# Patient Record
Sex: MALE | Race: WHITE | HISPANIC OR LATINO | Employment: OTHER | URBAN - METROPOLITAN AREA
[De-identification: names, ages, dates, MRNs, and addresses within clinical notes are randomized per-mention and may not be internally consistent; named-entity substitution may affect disease eponyms.]

---

## 2021-10-19 ENCOUNTER — HOSPITAL ENCOUNTER (OUTPATIENT)
Dept: RADIOLOGY | Facility: HOSPITAL | Age: 81
Discharge: HOME | End: 2021-10-19
Payer: COMMERCIAL

## 2021-10-19 DIAGNOSIS — M25.512 LEFT SHOULDER PAIN, UNSPECIFIED CHRONICITY: ICD-10-CM

## 2021-10-19 PROCEDURE — 73030 X-RAY EXAM OF SHOULDER: CPT

## 2021-10-25 ENCOUNTER — HOSPITAL ENCOUNTER (OUTPATIENT)
Dept: RADIOLOGY | Facility: HOSPITAL | Age: 81
Discharge: HOME | End: 2021-10-25
Payer: COMMERCIAL

## 2021-10-25 DIAGNOSIS — R91.8 LUNG MASS: ICD-10-CM

## 2021-10-25 DIAGNOSIS — M25.512 LEFT SHOULDER PAIN, UNSPECIFIED CHRONICITY: ICD-10-CM

## 2021-10-25 PROCEDURE — 71046 X-RAY EXAM CHEST 2 VIEWS: CPT

## 2021-11-18 ENCOUNTER — HOSPITAL ENCOUNTER (OUTPATIENT)
Dept: MRI IMAGING | Facility: HOSPITAL | Age: 81
Discharge: HOME | End: 2021-11-18
Payer: COMMERCIAL

## 2021-11-18 DIAGNOSIS — R93.6 ABNORMAL FINDINGS ON DIAGNOSTIC IMAGING OF LIMBS: ICD-10-CM

## 2021-11-18 PROCEDURE — 73221 MRI JOINT UPR EXTREM W/O DYE: CPT

## 2022-01-11 RX ORDER — PANTOPRAZOLE SODIUM 40 MG/1
TABLET, DELAYED RELEASE ORAL
COMMUNITY
Start: 2021-11-11

## 2022-01-14 ENCOUNTER — OFFICE VISIT (OUTPATIENT)
Dept: OBGYN CLINIC | Facility: CLINIC | Age: 82
End: 2022-01-14
Payer: COMMERCIAL

## 2022-01-14 VITALS
HEART RATE: 89 BPM | DIASTOLIC BLOOD PRESSURE: 54 MMHG | WEIGHT: 108 LBS | BODY MASS INDEX: 16 KG/M2 | SYSTOLIC BLOOD PRESSURE: 104 MMHG | HEIGHT: 69 IN

## 2022-01-14 DIAGNOSIS — S46.012A TRAUMATIC COMPLETE TEAR OF LEFT ROTATOR CUFF, INITIAL ENCOUNTER: Primary | ICD-10-CM

## 2022-01-14 PROCEDURE — 99243 OFF/OP CNSLTJ NEW/EST LOW 30: CPT | Performed by: ORTHOPAEDIC SURGERY

## 2022-01-14 RX ORDER — HYDROXYZINE HYDROCHLORIDE 10 MG/1
TABLET, FILM COATED ORAL
COMMUNITY
Start: 2021-12-19

## 2022-01-14 NOTE — LETTER
January 14, 2022     Shadia Forde, [address]    Patient: Jorje Wolfe   YOB: 1940   Date of Visit: 1/14/2022       Dear Dr Gabby Del Angel: Thank you for referring Jorje Wolfe to me for evaluation  Below are my notes for this consultation  If you have questions, please do not hesitate to call me  I look forward to following your patient along with you  Sincerely,        Leann Amador DO        CC: No Recipients  Leann Amador DO  1/14/2022 11:01 AM  Signed  Assessment:  1  Traumatic complete tear of left rotator cuff, initial encounter       There is no problem list on file for this patient  Plan      Pleasant 20-year-old male with complete rotator cuff tear of left shoulder  MRI of the left shoulder were obtained, reviewed and discussed with the patient and his son who is translating, which demonstrates complete rotator cuff tear with retraction and muscle atrophy  Treatment options were discussed which would consist of a supercapsular reconstruction or a reverse total shoulder replacement  Treatment options were discussed with the patient and his son  Which would consist of your rotator cuff repair which would be done arthroscopically or a reverse total shoulder replacement which would be an open procedure  Advised to begin formal physical therapy for range of motion strengthening to be optimized for surgical intervention  Referral was placed to see Dr Nory Cabello who does perform these procedures  Subjective:     Patient ID:    Chief Complaint:Herman Nathaly Glass Mendelraymond Chbil 80 y o  male      HPI    Patient comes in today with regards to left shoulder pain  Patient is present with his son who is translating  The patient reports that the pain is in the posterior aspect of the left shopulder and has been going on for April of last year  He was helping a cousin and performing repetive overhead activities    The pain is rated at0 at its best and10 at its worst  He notes having when picking up something from below the waist line his pain will be a 5 and then once he goes overhead it will shoot to a 9 or 10  Denies prior treatment of the left shoulder  Denies numbness and tingling  The pain is described as burning when he is performing overhead activities  It is worsened with overhead activites, and is made better with rest   The patient has taken no medications or topical gels for treatment  Patient was seen by his PCP who ordered an MRI of the left shoulder  The following portions of the patient's history were reviewed and updated as appropriate: allergies, current medications, past family history, past social history, past surgical history and problem list         Social History     Socioeconomic History    Marital status: /Civil Union     Spouse name: Not on file    Number of children: Not on file    Years of education: Not on file    Highest education level: Not on file   Occupational History    Not on file   Tobacco Use    Smoking status: Never Smoker    Smokeless tobacco: Never Used   Substance and Sexual Activity    Alcohol use: Not on file    Drug use: Not on file    Sexual activity: Not on file   Other Topics Concern    Not on file   Social History Narrative    Not on file     Social Determinants of Health     Financial Resource Strain: Not on file   Food Insecurity: Not on file   Transportation Needs: Not on file   Physical Activity: Not on file   Stress: Not on file   Social Connections: Not on file   Intimate Partner Violence: Not on file   Housing Stability: Not on file     No past medical history on file  No past surgical history on file  No Known Allergies  Current Outpatient Medications on File Prior to Visit   Medication Sig Dispense Refill    hydrOXYzine HCL (ATARAX) 10 mg tablet       pantoprazole (PROTONIX) 40 mg tablet        No current facility-administered medications on file prior to visit  Objective:    Review of Systems   Constitutional: Negative for appetite change and unexpected weight change  HENT: Negative for congestion and trouble swallowing  Eyes: Negative for visual disturbance  Respiratory: Negative for cough and shortness of breath  Cardiovascular: Negative for chest pain and palpitations  Gastrointestinal: Negative for nausea and vomiting  Endocrine: Negative for cold intolerance and heat intolerance  Musculoskeletal: Positive for arthralgias (Left shoulder)  Negative for gait problem and myalgias  Skin: Negative for rash  Neurological: Negative for numbness  Right Shoulder Exam     Comments:  150 forward flexion  120 abduction  T10 internal rotation  T1 external rotation  4/5 strength external rotation  3/5 strength abduction      Left Shoulder Exam     Tenderness   The patient is experiencing tenderness in the biceps tendon  Other   Erythema: absent  Scars: absent  Sensation: normal  Pulse: present     Comments:  Skin intact   No erythema or ecchymosis   No redness or warmth   No signs of infection   No gross deformities   90 forward flexion  90 abduction with pain   T10 internal rotation  C7 external rotation  3/5 strength external   2/5 strength flexion  4+/5 strength abduction            Physical Exam  Vitals reviewed  Constitutional:       Appearance: Normal appearance  He is well-developed  HENT:      Head: Normocephalic and atraumatic  Nose: Nose normal    Eyes:      General:         Right eye: No discharge  Left eye: No discharge  Conjunctiva/sclera: Conjunctivae normal    Cardiovascular:      Rate and Rhythm: Normal rate and regular rhythm  Pulses: Normal pulses  Pulmonary:      Effort: Pulmonary effort is normal  No respiratory distress  Musculoskeletal:         General: Normal range of motion  Cervical back: Normal range of motion and neck supple  Skin:     General: Skin is warm and dry     Neurological: Mental Status: He is alert and oriented to person, place, and time  Psychiatric:         Mood and Affect: Mood normal          Behavior: Behavior normal          Procedures  No Procedures performed today    I have personally reviewed pertinent films in PACS and my interpretation is MRI of the left shoulder was obtained which demonstrate complete tears of the supraspinatus and infraspinatus was torn tendon edges  High riding humeral head and mild muscle atrophy  A longitudinal split tear of the long head of the biceps tendon  Portions of the record may have been created with voice recognition software   Occasional wrong word or "sound a like" substitutions may have occurred due to the inherent limitations of voice recognition software   Read the chart carefully and recognize, using context, where substitutions have occurred      Scribe Attestation    I,:  Dagoberto Carmen MA am acting as a scribe while in the presence of the attending physician :       I,:  Herminia Damon DO personally performed the services described in this documentation    as scribed in my presence :

## 2022-01-14 NOTE — PROGRESS NOTES
Assessment:  1  Traumatic complete tear of left rotator cuff, initial encounter       There is no problem list on file for this patient  Plan      Pleasant 80-year-old male with complete rotator cuff tear of left shoulder  MRI of the left shoulder were obtained, reviewed and discussed with the patient and his son who is translating, which demonstrates complete rotator cuff tear with retraction and muscle atrophy  Treatment options were discussed which would consist of a supercapsular reconstruction or a reverse total shoulder replacement  Treatment options were discussed with the patient and his son  Which would consist of your rotator cuff repair which would be done arthroscopically or a reverse total shoulder replacement which would be an open procedure  Advised to begin formal physical therapy for range of motion strengthening to be optimized for surgical intervention  Referral was placed to see Dr Josefa Segovia who does perform these procedures  Subjective:     Patient ID:    Chief Complaint:Herman Neff Chbil 80 y o  male      HPI    Patient comes in today with regards to left shoulder pain  Patient is present with his son who is translating  The patient reports that the pain is in the posterior aspect of the left shopulder and has been going on for April of last year  He was helping a cousin and performing repetive overhead activities  The pain is rated at0 at its best and10 at its worst  He notes having when picking up something from below the waist line his pain will be a 5 and then once he goes overhead it will shoot to a 9 or 10  Denies prior treatment of the left shoulder  Denies numbness and tingling  The pain is described as burning when he is performing overhead activities  It is worsened with overhead activites, and is made better with rest   The patient has taken no medications or topical gels for treatment    Patient was seen by his PCP who ordered an MRI of the left shoulder  The following portions of the patient's history were reviewed and updated as appropriate: allergies, current medications, past family history, past social history, past surgical history and problem list         Social History     Socioeconomic History    Marital status: /Civil Union     Spouse name: Not on file    Number of children: Not on file    Years of education: Not on file    Highest education level: Not on file   Occupational History    Not on file   Tobacco Use    Smoking status: Never Smoker    Smokeless tobacco: Never Used   Substance and Sexual Activity    Alcohol use: Not on file    Drug use: Not on file    Sexual activity: Not on file   Other Topics Concern    Not on file   Social History Narrative    Not on file     Social Determinants of Health     Financial Resource Strain: Not on file   Food Insecurity: Not on file   Transportation Needs: Not on file   Physical Activity: Not on file   Stress: Not on file   Social Connections: Not on file   Intimate Partner Violence: Not on file   Housing Stability: Not on file     No past medical history on file  No past surgical history on file  No Known Allergies  Current Outpatient Medications on File Prior to Visit   Medication Sig Dispense Refill    hydrOXYzine HCL (ATARAX) 10 mg tablet       pantoprazole (PROTONIX) 40 mg tablet        No current facility-administered medications on file prior to visit  Objective:    Review of Systems   Constitutional: Negative for appetite change and unexpected weight change  HENT: Negative for congestion and trouble swallowing  Eyes: Negative for visual disturbance  Respiratory: Negative for cough and shortness of breath  Cardiovascular: Negative for chest pain and palpitations  Gastrointestinal: Negative for nausea and vomiting  Endocrine: Negative for cold intolerance and heat intolerance  Musculoskeletal: Positive for arthralgias (Left shoulder)   Negative for gait problem and myalgias  Skin: Negative for rash  Neurological: Negative for numbness  Right Shoulder Exam     Comments:  150 forward flexion  120 abduction  T10 internal rotation  T1 external rotation  4/5 strength external rotation  3/5 strength abduction      Left Shoulder Exam     Tenderness   The patient is experiencing tenderness in the biceps tendon  Other   Erythema: absent  Scars: absent  Sensation: normal  Pulse: present     Comments:  Skin intact   No erythema or ecchymosis   No redness or warmth   No signs of infection   No gross deformities   90 forward flexion  90 abduction with pain   T10 internal rotation  C7 external rotation  3/5 strength external   2/5 strength flexion  4+/5 strength abduction            Physical Exam  Vitals reviewed  Constitutional:       Appearance: Normal appearance  He is well-developed  HENT:      Head: Normocephalic and atraumatic  Nose: Nose normal    Eyes:      General:         Right eye: No discharge  Left eye: No discharge  Conjunctiva/sclera: Conjunctivae normal    Cardiovascular:      Rate and Rhythm: Normal rate and regular rhythm  Pulses: Normal pulses  Pulmonary:      Effort: Pulmonary effort is normal  No respiratory distress  Musculoskeletal:         General: Normal range of motion  Cervical back: Normal range of motion and neck supple  Skin:     General: Skin is warm and dry  Neurological:      Mental Status: He is alert and oriented to person, place, and time  Psychiatric:         Mood and Affect: Mood normal          Behavior: Behavior normal          Procedures  No Procedures performed today    I have personally reviewed pertinent films in PACS and my interpretation is MRI of the left shoulder was obtained which demonstrate complete tears of the supraspinatus and infraspinatus was torn tendon edges  High riding humeral head and mild muscle atrophy    A longitudinal split tear of the long head of the biceps tendon  Portions of the record may have been created with voice recognition software   Occasional wrong word or "sound a like" substitutions may have occurred due to the inherent limitations of voice recognition software   Read the chart carefully and recognize, using context, where substitutions have occurred      Scribe Attestation    I,:  Daniela Hennessy MA am acting as a scribe while in the presence of the attending physician :       I,:  Ave Sloan,  personally performed the services described in this documentation    as scribed in my presence :

## 2022-01-27 ENCOUNTER — EVALUATION (OUTPATIENT)
Dept: PHYSICAL THERAPY | Facility: REHABILITATION | Age: 82
End: 2022-01-27
Payer: COMMERCIAL

## 2022-01-27 DIAGNOSIS — S46.012D TRAUMATIC COMPLETE TEAR OF LEFT ROTATOR CUFF, SUBSEQUENT ENCOUNTER: Primary | ICD-10-CM

## 2022-01-27 PROCEDURE — 97110 THERAPEUTIC EXERCISES: CPT | Performed by: PHYSICAL THERAPIST

## 2022-01-27 PROCEDURE — 97530 THERAPEUTIC ACTIVITIES: CPT | Performed by: PHYSICAL THERAPIST

## 2022-01-27 PROCEDURE — 97161 PT EVAL LOW COMPLEX 20 MIN: CPT | Performed by: PHYSICAL THERAPIST

## 2022-01-27 NOTE — PROGRESS NOTES
PT Evaluation     Today's date: 2022  Patient name: Ngoc Watts  : 1940  MRN: [id number]  Referring provider: Antonia Farrell DO  Dx:   Encounter Diagnosis     ICD-10-CM    1  Traumatic complete tear of left rotator cuff, subsequent encounter  S46 012D Ambulatory Referral to Physical Therapy       Start Time: 1020  Stop Time: 1114  Total time in clinic (min): 54 minutes    Assessment  Assessment details: Ngoc Watts is an 80y o  year old male who presents to [facility] with traumatic complete tear of left rotator cuff  As a result of this injury he presents with limitations in left shoulder range of motion, strength and pain with function  This is limiting him from functional activities such as reaching overhead, lifting and dressing  He will benefit from prehab to maintain mobility and improve strength prior to surgical intervention  Arias Greer presents with the impairments as listed above and would benefit from Physical Therapy to address these impairments, improved quality of life and to maximize function  Impairments: abnormal or restricted ROM, activity intolerance, impaired physical strength, lacks appropriate home exercise program, pain with function and poor posture   Understanding of Dx/Px/POC: good   Prognosis: good    Goals  ST  Patient will report <5/10 pain with all activity  2  Patient will maintain left shoulder range of motion  LT  Patient will be independent with HEP upon discharge  2  Patient will demonstrate improvement in left shoulder strength by 1-2 grades  Plan  Plan details: Thank you for the opportunity to participate in the care of Ngoc Watts      Patient would benefit from: PT eval and skilled physical therapy  Planned modality interventions: cryotherapy, unattended electrical stimulation and TENS  Planned therapy interventions: activity modification, flexibility, home exercise program, therapeutic exercise, therapeutic activities, stretching, strengthening, postural training, patient education, neuromuscular re-education, manual therapy and joint mobilization  Frequency: 2x week  Duration in visits: 10  Duration in weeks: 5  Plan of Care beginning date: 2022  Treatment plan discussed with: patient        Subjective Evaluation    History of Present Illness  Mechanism of injury: Patient's son present throughout evaluation for translation  Patient is an 80 y o  presenting to physical therapy with complaints of left shoulder pain beginning in 2021  Patient denies specific injury to the left shoulder but in April he was helping a cousin and did a lot of repetitive overhead activity  Patient's son reports full tears and retraction of the supraspinatus and infraspinatus  He saw Dr Mu York and has a second opinion scheduled with Dr Leann Hunt on 2022  They are anticipating that he will need surgical intervention  He has fair mobility below the waist but has pain when he tries to raise the shoulder  He needs assistance to reach something overhead  He has pain and challenge with donning clothes  N/T/Radicular pain: None - can have referred pain to the lateral upper arm  Imaging: MRI  PT goals: "get the other muscles strong to get ready for surgery and gain more mobility"   Pain  Current pain ratin  At best pain ratin  At worst pain ratin  Quality: dull ache  Relieving factors: rest, relaxation and change in position  Aggravating factors: lifting and overhead activity    Social Support  Lives with: adult children    Employment status: not working  Hand dominance: left    Treatments  Current treatment: physical therapy  Patient Goals  Patient goals for therapy: decreased pain, increased strength, increased motion, independence with ADLs/IADLs and return to sport/leisure activities          Objective     Postural Observations  Seated posture: fair  Standing posture: fair        Observations   Left Shoulder   Positive for atrophy  Tenderness     Left Shoulder   Tenderness in the biceps tendon (proximal) and supraspinatus tendon  Active Range of Motion   Left Shoulder   Flexion: 110 degrees with pain  Abduction: 80 degrees with pain  External rotation 45°: 70 degrees with pain  External rotation BTH: Active external rotation behind the head: NT  Internal rotation BTB: Active internal rotation behind the back: NT      Right Shoulder   Normal active range of motion    Passive Range of Motion   Left Shoulder   Flexion: 120 degrees with pain  Abduction: 85 degrees with pain  External rotation 45°: 72 degrees with pain    Strength/Myotome Testing     Left Shoulder     Planes of Motion   Flexion: 3-   Abduction: 2+   External rotation at 0°: 3+   Internal rotation at 0°: 4     Isolated Muscles   Biceps: 5   Triceps: 5     Right Shoulder     Planes of Motion   Flexion: 4+   Abduction: 4+   External rotation at 0°: 4+   Internal rotation at 0°: 4+     Isolated Muscles   Biceps: 5   Triceps: 5                    Precautions: History of esophageal cancer    Daily Treatment Diary:    Manuals 1/27/2022           Left Shoulder PROM nv                                   Neuromuscular Re-education            Pulleys flx/scap?             Scap retractions* 2x10x5"           RTC IR/ER iso            Ant/post deltoid iso 10x5"                        Cane ER* 10x5"                       Tables slides - flx nv           Table slides - scaption nv                                               Therapeutic Exercise            UBE                         TB low rows  nv           TB mid rows nv           TB ER/IR            TB bilateral ER w retraction            TB horizontal abduction                                    Prone Is            Prone Ts            Prone Ys            SL ER            SL abduction            SL flexion            Therapeutic Activity            Wall Ball rolls            Standing Scaption            Patient education Done * = on HEP

## 2022-02-02 ENCOUNTER — OFFICE VISIT (OUTPATIENT)
Dept: PHYSICAL THERAPY | Facility: REHABILITATION | Age: 82
End: 2022-02-02
Payer: COMMERCIAL

## 2022-02-02 DIAGNOSIS — S46.012D TRAUMATIC COMPLETE TEAR OF LEFT ROTATOR CUFF, SUBSEQUENT ENCOUNTER: Primary | ICD-10-CM

## 2022-02-02 PROCEDURE — 97110 THERAPEUTIC EXERCISES: CPT

## 2022-02-02 PROCEDURE — 97112 NEUROMUSCULAR REEDUCATION: CPT

## 2022-02-02 PROCEDURE — 97140 MANUAL THERAPY 1/> REGIONS: CPT

## 2022-02-02 NOTE — PROGRESS NOTES
Daily Note     Today's date: 2022  Patient name: Gildardo Alanis  : 1940  MRN: [id number]  Referring provider: Logan Carlson DO  Dx:   Encounter Diagnosis     ICD-10-CM    1  Traumatic complete tear of left rotator cuff, subsequent encounter  S46 012D        Start Time: 1445  Stop Time: 1525  Total time in clinic (min): 40 minutes    Subjective: Patient reports compliance with HEP, reporting no complaints, occasional soreness  Patient's son reports most discomfort with reaching over head or sleeping on left side  Objective: See treatment diary below      Assessment: Tolerated treatment well  Patient demonstrated fatigue post treatment, exhibited good technique with therapeutic exercises and would benefit from continued PT Initiated POC this session with patient tolerating well, pain noted with completion of pulleys, did not complete  Cues required for pain free ranges especially with table slides  Patient given updated HEP this session with patient reporting understanding  Plan: Continue per plan of care  Progress treatment as tolerated  Precautions: History of esophageal cancer    Daily Treatment Diary:    Manuals 2022          Left Shoulder PROM nv Done                                  Neuromuscular Re-education            Pulleys flx/scap?   Pain           Scap retractions* 2x10x5" 2x10x5''          RTC IR/ER iso            Ant/post deltoid iso 10x5"  20x5''                      Cane ER* 10x5" 2x10x5''                      Tables slides - flx nv 10x5''          Table slides - scaption nv 10x5''                                              Therapeutic Exercise            UBE                         TB low rows  nv PTB 2x10          TB mid rows nv PTB 2x10          TB ER/IR            TB bilateral ER w retraction            TB horizontal abduction                                    Prone Is            Prone Ts            Prone Ys            SL ER            SL abduction            SL flexion            Therapeutic Activity            Wall Ball rolls            Standing Scaption            Patient education Done                       * = on HEP

## 2022-02-07 ENCOUNTER — OFFICE VISIT (OUTPATIENT)
Dept: PHYSICAL THERAPY | Facility: REHABILITATION | Age: 82
End: 2022-02-07
Payer: COMMERCIAL

## 2022-02-07 DIAGNOSIS — S46.012D TRAUMATIC COMPLETE TEAR OF LEFT ROTATOR CUFF, SUBSEQUENT ENCOUNTER: Primary | ICD-10-CM

## 2022-02-07 PROCEDURE — 97112 NEUROMUSCULAR REEDUCATION: CPT

## 2022-02-07 PROCEDURE — 97110 THERAPEUTIC EXERCISES: CPT

## 2022-02-07 PROCEDURE — 97140 MANUAL THERAPY 1/> REGIONS: CPT

## 2022-02-07 NOTE — PROGRESS NOTES
Daily Note     Today's date: 2022  Patient name: Devorah Muñoz  : 1940  MRN: [id number]  Referring provider: Shane Bond DO  Dx:   Encounter Diagnosis     ICD-10-CM    1  Traumatic complete tear of left rotator cuff, subsequent encounter  S46 012D        Start Time: 1610  Stop Time: 1650  Total time in clinic (min): 40 minutes    Subjective: Patient reporting shoulder as "okay" at start of session  He is to follow up with MD tomorrow  Patient reports no complaints after previous session  Objective: See treatment diary below      Assessment: Tolerated treatment well  Patient demonstrated fatigue post treatment, exhibited good technique with therapeutic exercises and would benefit from continued PT No pain reported with any TE performed, only reports of muscle fatigue especially with isometrics  Improved tolerance to table slides  Plan: Continue per plan of care  Progress treatment as tolerated  Precautions: History of esophageal cancer    Daily Treatment Diary:    Manuals 2022         Left Shoulder PROM nv Done Done                                  Neuromuscular Re-education            Pulleys flx/scap?   Pain           Scap retractions* 2x10x5" 2x10x5'' 3x10x5''         RTC IR/ER iso   10x5'' ea         Ant/post deltoid iso 10x5"  20x5'' 20x5'' ea                     Cane ER* 10x5" 2x10x5'' 2x10x5''                     Tables slides - flx nv 10x5'' 20x5''         Table slides - scaption nv 10x5'' 20x5''                                             Therapeutic Exercise            UBE                         TB low rows  nv PTB 2x10 PTB 3x10         TB mid rows nv PTB 2x10 OTB 3x10         TB ER/IR            TB bilateral ER w retraction            TB horizontal abduction                                    Prone Is            Prone Ts            Prone Ys            SL ER            SL abduction            SL flexion            Therapeutic Activity Wall Ball rolls            Standing Scaption            Patient education Done                       * = on HEP

## 2022-02-08 VITALS
HEIGHT: 68 IN | DIASTOLIC BLOOD PRESSURE: 56 MMHG | BODY MASS INDEX: 16.52 KG/M2 | SYSTOLIC BLOOD PRESSURE: 101 MMHG | TEMPERATURE: 97.2 F | WEIGHT: 109 LBS | HEART RATE: 71 BPM

## 2022-02-08 DIAGNOSIS — S46.012A TRAUMATIC COMPLETE TEAR OF LEFT ROTATOR CUFF, INITIAL ENCOUNTER: ICD-10-CM

## 2022-02-08 PROCEDURE — 99214 OFFICE O/P EST MOD 30 MIN: CPT | Performed by: ORTHOPAEDIC SURGERY

## 2022-02-08 NOTE — PROGRESS NOTES
Orthopaedic Surgery - Office Note  Gisel Cummins (82 y o  male)   : 1940   MRN: [id number]  Encounter Date: 2022    Chief Complaint   Patient presents with    Left Shoulder - Pain       Assessment / Plan  Left shoulder rotator cuff tear with biceps fraying noted    · Discussed surgical intervention options of RCR vs Reverse TSA  · Discussed better outcomes with Reverse TSA due to retraction and age  · Discussed possibilty of combining CSI with PT for improved relief of pain   · Continue outpatient PT  · Anti-inflammatories or Tylenol prn pain  · Ice 20 minutes at a time as needed  Return in about 6 weeks (around 3/22/2022) for Recheck after continued PT  Possible surgical discussion  History of Present Illness  Gisel Cummins is a 80 y o  male who presents today for initial evaluation Left shoulder pain, referred to me by Dr Cydney Santana  He presents today with his son who offers translation  He states that he has currently been experiencing posterior and lateral shoulder pain that is exacerbated with reaching overhead  He states this started back in 2021 while reaching overhead and hang curtains  He denies any fall or injury to his shoulder at that time  He denies any sense of instability  He more recently began formal physical therapy  He has not received any cortisone injections for shoulder  He has noticed that certain activities of daily living are harder to perform since his shoulder pain started  Review of Systems  Pertinent items are noted in HPI  All other systems were reviewed and are negative  Physical Exam  /56   Pulse 71   Temp (!) 97 2 °F (36 2 °C)   Ht 5' 8" (1 727 m)   Wt 49 4 kg (109 lb)   BMI 16 57 kg/m²   Cons: Appears well  No apparent distress  Psych: Alert  Oriented x3  Mood and affect normal   Eyes: PERRLA, EOMI  Resp: Normal effort  No audible wheezing or stridor  CV: Palpable pulse  No discernable arrhythmia  No LE edema    Lymph:  No palpable cervical, axillary, or inguinal lymphadenopathy  Skin: Warm  No palpable masses  No visible lesions  Neuro: Normal muscle tone  Normal and symmetric DTR's  Left Shoulder Exam  Alignment / Posture:  Normal shoulder posture  Inspection:  No swelling  No edema  No erythema  No ecchymosis  Palpation:  Subacromial and bicepital groove tenderness  No effusion  ROM:  Shoulder FE Active 80, Passive 120   Shoulder ER 40  Shoulder IR T10  Strength:  Subscapularis 5/5  Forward Elevation 3+/5  External Rotation 3+/5  Stability:  No objective shoulder instability  Tests: (+) Neer  (-) Belly press  Neurovascular:  Sensation intact in Ax/R/M/U nerve distributions  2+ radial pulse  Studies Reviewed  I have personally reviewed pertinent films in PACS  XR of left, right shoulder - Performed on 10/19/2021 reveals mild osteoarthritis  MRI of left shoulder - Perfermoded on 11/18/2021 reveal complete supraspinatus and infraspinatus with noted biceps fraying     Procedures  No procedures today  Medical, Surgical, Family, and Social History  The patient's medical history, family history, and social history, were reviewed and updated as appropriate  Past Medical History:   Diagnosis Date    Esophageal cancer ([facility] [address] ) 2011       History reviewed  No pertinent surgical history  History reviewed  No pertinent family history      Social History     Occupational History    Not on file   Tobacco Use    Smoking status: Never Smoker    Smokeless tobacco: Never Used   Substance and Sexual Activity    Alcohol use: Not on file    Drug use: Not on file    Sexual activity: Not on file       No Known Allergies      Current Outpatient Medications:     hydrOXYzine HCL (ATARAX) 10 mg tablet, , Disp: , Rfl:     pantoprazole (PROTONIX) 40 mg tablet, , Disp: , Rfl:       Mariana Fabry    Scribe Attestation    I,:  Mariana Fabry am acting as a scribe while in the presence of the attending physician :       I,:  Janeth Kenney Madison Humphreys MD personally performed the services described in this documentation    as scribed in my presence :

## 2022-02-09 ENCOUNTER — OFFICE VISIT (OUTPATIENT)
Dept: PHYSICAL THERAPY | Facility: REHABILITATION | Age: 82
End: 2022-02-09
Payer: COMMERCIAL

## 2022-02-09 DIAGNOSIS — S46.012D TRAUMATIC COMPLETE TEAR OF LEFT ROTATOR CUFF, SUBSEQUENT ENCOUNTER: Primary | ICD-10-CM

## 2022-02-09 PROCEDURE — 97110 THERAPEUTIC EXERCISES: CPT

## 2022-02-09 PROCEDURE — 97140 MANUAL THERAPY 1/> REGIONS: CPT

## 2022-02-09 PROCEDURE — 97112 NEUROMUSCULAR REEDUCATION: CPT

## 2022-02-09 NOTE — PROGRESS NOTES
Daily Note     Today's date: 2022  Patient name: Yvette Salas  : 1940  MRN: [id number]  Referring provider: Tara Szymanski DO  Dx:   Encounter Diagnosis     ICD-10-CM    1  Traumatic complete tear of left rotator cuff, subsequent encounter  S46 012D        Start Time: 0800  Stop Time: 0845  Total time in clinic (min): 45 minutes    Subjective: Patient reporting fatigue at start of session  He reports no complaints after previous session  Patient followed up with MD yesterday and will follow back up again in 6 weeks  Objective: See treatment diary below      Assessment: Tolerated treatment well  Patient demonstrated fatigue post treatment, exhibited good technique with therapeutic exercises and would benefit from continued PT Improved tolerance/ ROM noted with table slides  Guarding noted with manuals, especially flexion, cues for relaxation required  No pain reported with any TE performed, most fatigue noted with ER isometric  Patient given updated HEP this session with patient reporting understanding  Plan: Continue per plan of care  Progress treatment as tolerated  Precautions: History of esophageal cancer    Daily Treatment Diary:    Manuals 2022        Left Shoulder PROM nv Done Done  Done                                Neuromuscular Re-education            Pulleys flx/scap?   Pain           Scap retractions* 2x10x5" 2x10x5'' 3x10x5'' 3x10x5''        RTC IR/ER iso   10x5'' ea 20x5''        Ant/post deltoid iso 10x5"  20x5'' 20x5'' ea 20x5'' ea                    Cane ER* 10x5" 2x10x5'' 2x10x5'' 2x10x5''                    Tables slides - flx nv 10x5'' 20x5'' 20x5''        Table slides - scaption nv 10x5'' 20x5'' 20x5''                                            Therapeutic Exercise            UBE                         TB low rows  nv PTB 2x10 PTB 3x10 OTB 3x10        TB mid rows nv PTB 2x10 OTB 3x10 OTB 3x10        TB ER/IR            TB bilateral ER w retraction            TB horizontal abduction                                    Prone Is            Prone Ts            Prone Ys            SL ER            SL abduction            SL flexion            Therapeutic Activity            Wall Ball rolls            Standing Scaption            Patient education Done                       * = on HEP

## 2022-02-14 ENCOUNTER — OFFICE VISIT (OUTPATIENT)
Dept: PHYSICAL THERAPY | Facility: REHABILITATION | Age: 82
End: 2022-02-14
Payer: COMMERCIAL

## 2022-02-14 DIAGNOSIS — S46.012D TRAUMATIC COMPLETE TEAR OF LEFT ROTATOR CUFF, SUBSEQUENT ENCOUNTER: Primary | ICD-10-CM

## 2022-02-14 PROCEDURE — 97112 NEUROMUSCULAR REEDUCATION: CPT | Performed by: PHYSICAL THERAPIST

## 2022-02-14 PROCEDURE — 97140 MANUAL THERAPY 1/> REGIONS: CPT | Performed by: PHYSICAL THERAPIST

## 2022-02-14 PROCEDURE — 97110 THERAPEUTIC EXERCISES: CPT | Performed by: PHYSICAL THERAPIST

## 2022-02-14 NOTE — PROGRESS NOTES
Daily Note     Today's date: 2022  Patient name: Carla Lunsford  : 1940  MRN: [id number]  Referring provider: Andrés Gonazlez DO  Dx:   Encounter Diagnosis     ICD-10-CM    1  Traumatic complete tear of left rotator cuff, subsequent encounter  S46 012D        Start Time: 804  Stop Time: 08  Total time in clinic (min): 41 minutes    Subjective: Patient feels he can do more activities with less pain compared to before  He reports the shoulder feels good at start of session  Patient is compliant to HEP 2x/day  Objective: See treatment diary below      Assessment: Tolerated treatment well  Progressed patient with increased repetitions and resistance this visit with good tolerance  Patient demonstrates good carryover of form throughout session from previous sessions  Some complaints of pain/soreness at completion of session  Patient demonstrated fatigue post treatment, exhibited good technique with therapeutic exercises and would benefit from continued PT  Plan: Continue per plan of care  Precautions: History of esophageal cancer    Daily Treatment Diary:    Manuals 2022       Left Shoulder PROM nv Done Done  Done Done                               Neuromuscular Re-education            Pulleys flx/scap?   Pain           Scap retractions* 2x10x5" 2x10x5'' 3x10x5'' 3x10x5'' 3x10x5"       RTC IR/ER iso   10x5'' ea 20x5'' 20x5" ea       Ant/post deltoid iso 10x5"  20x5'' 20x5'' ea 20x5'' ea 20x5" ea                   Cane ER* 10x5" 2x10x5'' 2x10x5'' 2x10x5'' 2x10x5"                   Tables slides - flx nv 10x5'' 20x5'' 20x5'' 20x5"       Table slides - scaption nv 10x5'' 20x5'' 20x5'' 20x5"       TB ER step out     nv       TB IR step out     nv                   Therapeutic Exercise            UBE                         TB low rows  nv PTB 2x10 PTB 3x10 OTB 3x10 OTB 3x12       TB mid rows nv PTB 2x10 OTB 3x10 OTB 3x10 GTB 3x12       TB ER/IR            TB bilateral ER w retraction            TB horizontal abduction                                    Prone Is            Prone Ts            Prone Ys            SL ER            SL abduction            SL flexion            Therapeutic Activity            Wall Ball rolls            Standing Scaption            Patient education Done                       * = on HEP

## 2022-02-18 ENCOUNTER — OFFICE VISIT (OUTPATIENT)
Dept: PHYSICAL THERAPY | Facility: REHABILITATION | Age: 82
End: 2022-02-18
Payer: COMMERCIAL

## 2022-02-18 DIAGNOSIS — S46.012D TRAUMATIC COMPLETE TEAR OF LEFT ROTATOR CUFF, SUBSEQUENT ENCOUNTER: Primary | ICD-10-CM

## 2022-02-18 PROCEDURE — 97110 THERAPEUTIC EXERCISES: CPT

## 2022-02-18 PROCEDURE — 97112 NEUROMUSCULAR REEDUCATION: CPT

## 2022-02-18 PROCEDURE — 97140 MANUAL THERAPY 1/> REGIONS: CPT

## 2022-02-18 NOTE — PROGRESS NOTES
Daily Note     Today's date: 2022  Patient name: gNoc Watts  : 1940  MRN: [id number]  Referring provider: Antonia Farrell DO  Dx:   Encounter Diagnosis     ICD-10-CM    1  Traumatic complete tear of left rotator cuff, subsequent encounter  S46 012D        Start Time: 0800  Stop Time: 0842  Total time in clinic (min): 42 minutes    Subjective: Patient reporting shoulder as "good" at start of session  Objective: See treatment diary below      Assessment: Tolerated treatment well  Patient demonstrated fatigue post treatment, exhibited good technique with therapeutic exercises and would benefit from continued PT Patient reporting improvements with shoulder ROM  Trialed TB IR/ER step outs this session with patient tolerating well, no pain reported, however challenge noted with ER  Plan: Continue per plan of care  Progress treatment as tolerated  Precautions: History of esophageal cancer    Daily Treatment Diary:    Manuals 2022    Left Shoulder PROM nv Done Done  Done Done Done                        Neuromuscular Re-education          Pulleys flx/scap?   Pain         Scap retractions* 2x10x5" 2x10x5'' 3x10x5'' 3x10x5'' 3x10x5" 3x10x5''    RTC IR/ER iso   10x5'' ea 20x5'' 20x5" ea 20x5'' ea    Ant/post deltoid iso 10x5"  20x5'' 20x5'' ea 20x5'' ea 20x5" ea 20x5'' ea              Cane ER* 10x5" 2x10x5'' 2x10x5'' 2x10x5'' 2x10x5" 2x10x5''              Tables slides - flx nv 10x5'' 20x5'' 20x5'' 20x5" 20x5''    Table slides - scaption nv 10x5'' 20x5'' 20x5'' 20x5" 20x5''    TB ER step out     nv PTB 10    TB IR step out     nv PTB 2x10              Therapeutic Exercise          UBE                     TB low rows  nv PTB 2x10 PTB 3x10 OTB 3x10 OTB 3x12 OTB 3x15    TB mid rows nv PTB 2x10 OTB 3x10 OTB 3x10 GTB 3x12 GTB 3x15    TB ER/IR          TB bilateral ER w retraction          TB horizontal abduction                              Prone Is          Prone Ts          Prone Ys          SL ER          SL abduction          SL flexion          Therapeutic Activity          Wall Ball rolls          Standing Scaption          Patient education Done                   * = on HEP

## 2022-02-21 ENCOUNTER — OFFICE VISIT (OUTPATIENT)
Dept: PHYSICAL THERAPY | Facility: REHABILITATION | Age: 82
End: 2022-02-21
Payer: COMMERCIAL

## 2022-02-21 DIAGNOSIS — S46.012D TRAUMATIC COMPLETE TEAR OF LEFT ROTATOR CUFF, SUBSEQUENT ENCOUNTER: Primary | ICD-10-CM

## 2022-02-21 PROCEDURE — 97110 THERAPEUTIC EXERCISES: CPT | Performed by: PHYSICAL THERAPIST

## 2022-02-21 PROCEDURE — 97140 MANUAL THERAPY 1/> REGIONS: CPT | Performed by: PHYSICAL THERAPIST

## 2022-02-21 PROCEDURE — 97112 NEUROMUSCULAR REEDUCATION: CPT | Performed by: PHYSICAL THERAPIST

## 2022-02-21 NOTE — PROGRESS NOTES
Daily Note/Discharge Note     Today's date: 2022  Patient name: Cristin Hanna  : 1940  MRN: [id number]  Referring provider: Corbin Mcfarlane DO  Dx:   Encounter Diagnosis     ICD-10-CM    1  Traumatic complete tear of left rotator cuff, subsequent encounter  S46 012D        Start Time: 0804  Stop Time: 0850  Total time in clinic (min): 46 minutes    Subjective: Patient reports the shoulder feels much better and has noticed improvement since beginning PT  He reports he is able to lift his arm further overhead with less pain  Objective: See treatment diary below      Assessment: Tolerated treatment well  Patient is [place] out of [place] and will be discharged from PT at this time with comprehensive HEP  He was provided a handout for updated HEP  Patient demonstrated fatigue post treatment, exhibited good technique with therapeutic exercises and would benefit from continued PT  Plan: Discharge patient with comprehensive HEP  Precautions: History of esophageal cancer    Daily Treatment Diary:    Manuals 2022   Left Shoulder PROM nv Done Done  Done Done Done Done                       Neuromuscular Re-education          Pulleys flx/scap?   Pain         Scap retractions* 2x10x5" 2x10x5'' 3x10x5'' 3x10x5'' 3x10x5" 3x10x5'' 3x10x5"   RTC IR/ER iso   10x5'' ea 20x5'' 20x5" ea 20x5'' ea 20x5" ea   Ant/post deltoid iso 10x5"  20x5'' 20x5'' ea 20x5'' ea 20x5" ea 20x5'' ea 20x5" ea             Cane ER* 10x5" 2x10x5'' 2x10x5'' 2x10x5'' 2x10x5" 2x10x5'' 2x10x5"             Tables slides - flx nv 10x5'' 20x5'' 20x5'' 20x5" 20x5'' 20x5"   Table slides - scaption nv 10x5'' 20x5'' 20x5'' 20x5" 20x5'' 20x5"   TB ER step out     nv PTB 10    TB IR step out     nv PTB 2x10              Therapeutic Exercise          UBE                     TB low rows  nv PTB 2x10 PTB 3x10 OTB 3x10 OTB 3x12 OTB 3x15 OTB 3x15   TB mid rows nv PTB 2x10 OTB 3x10 OTB 3x10 GTB 3x12 GTB 3x15 GTB 3x15   TB ER/IR          TB bilateral ER w retraction          TB horizontal abduction                              Prone Is          Prone Ts          Prone Ys          SL ER          SL abduction          SL flexion          Therapeutic Activity          Wall Ball rolls          Standing Scaption          Patient education Done                   * = on HEP

## 2022-02-25 ENCOUNTER — APPOINTMENT (OUTPATIENT)
Dept: PHYSICAL THERAPY | Facility: REHABILITATION | Age: 82
End: 2022-02-25
Payer: COMMERCIAL

## 2022-07-29 ENCOUNTER — OFFICE VISIT (OUTPATIENT)
Dept: PHYSICAL THERAPY | Facility: REHABILITATION | Age: 82
End: 2022-07-29
Payer: COMMERCIAL

## 2022-07-29 DIAGNOSIS — G89.29 CHRONIC LEFT SHOULDER PAIN: Primary | ICD-10-CM

## 2022-07-29 DIAGNOSIS — S46.012D TRAUMATIC COMPLETE TEAR OF LEFT ROTATOR CUFF, SUBSEQUENT ENCOUNTER: ICD-10-CM

## 2022-07-29 DIAGNOSIS — M25.512 CHRONIC LEFT SHOULDER PAIN: Primary | ICD-10-CM

## 2022-07-29 PROCEDURE — 97110 THERAPEUTIC EXERCISES: CPT | Performed by: PHYSICAL THERAPIST

## 2022-07-29 PROCEDURE — 97161 PT EVAL LOW COMPLEX 20 MIN: CPT | Performed by: PHYSICAL THERAPIST

## 2022-07-29 NOTE — PROGRESS NOTES
PT Evaluation     Today's date: 2022  Patient name: Nella Rico  : 1940  MRN: [id number]  Referring provider: Praful Walton PT  Dx:   Encounter Diagnosis     ICD-10-CM    1  Chronic left shoulder pain  M25 512     G89 29    2  Traumatic complete tear of left rotator cuff, subsequent encounter  S46 012D        Start Time: 1015  Stop Time: 1100  Total time in clinic (min): 45 minutes    Assessment  Assessment details: Nella Rico is an 80y o  year old male who presents to IE with traumatic complete tear of left rotator cuff  As a result of this injury he presents with limitations in left shoulder range of motion, strength and pain with function  This is limiting him from functional activities such as reaching overhead, lifting and dressing  He will benefit from PT to maintain mobility and improve strength and prevent decline of left shoulder function  Julio Mandujano presents with the impairments as listed above and would benefit from Physical Therapy to address these impairments, improved quality of life and to maximize function  Impairments: abnormal or restricted ROM, activity intolerance, impaired physical strength, lacks appropriate home exercise program, pain with function and poor posture   Understanding of Dx/Px/POC: good   Prognosis: good    Goals  ST  Patient will report <5/10 pain with all activity  2  Patient will maintain left shoulder range of motion  LT  Patient will be independent with HEP upon discharge  2  Patient will demonstrate improvement in left shoulder strength by 1-2 grades  3  Patient will demonstrate improvement in left shoulder range of motion by 50%  Plan  Plan details: Thank you for the opportunity to participate in the care of Nella Rico      Patient would benefit from: PT eval and skilled physical therapy  Planned modality interventions: cryotherapy, unattended electrical stimulation and TENS  Planned therapy interventions: activity modification, flexibility, home exercise program, therapeutic exercise, therapeutic activities, stretching, strengthening, postural training, patient education, neuromuscular re-education, manual therapy and joint mobilization  Frequency: 2x week  Duration in visits: 8  Duration in weeks: 4  Plan of Care beginning date: 2022  Plan of Care expiration date: 2022  Treatment plan discussed with: patient        Subjective Evaluation    History of Present Illness  Mechanism of injury: Patient is a 80 y o  presenting to physical therapy with complaints of chronic left shoulder pain  Patient was seen in PT for this same problem January-2021  Patient denies specific injury to the left shoulder but in 2021 he was helping a cousin and did a lot of repetitive overhead activity  Patient's son reports full tears and retraction of the supraspinatus and infraspinatus  He reports he is in more pain now than when we saw him in February  He reports pain at rest and in the posterior shoulder with lifting or reaching overhead  He can get a shooting pain in the left shoulder  Patient has pain when sleeping on that side  He traveled to [facility] for a few months and did not keep up with the exercises  Denies numbness/tingling  Patient's son and wife present throughout evaluation for translation  Pain  Current pain ratin  At best pain rating: 3  At worst pain ratin  Quality: dull ache and sharp  Relieving factors: rest, relaxation and change in position  Aggravating factors: lifting and overhead activity    Social Support  Lives with: adult children    Employment status: not working  Hand dominance: left    Treatments  Current treatment: physical therapy  Patient Goals  Patient goals for therapy: decreased pain, increased strength, increased motion, independence with ADLs/IADLs and return to sport/leisure activities          Objective     Static Posture     Head  Forward      Shoulders  Asymmetric shoulders and rounded  Thoracic Spine  Hyperkyphosis  Postural Observations  Seated posture: poor  Standing posture: fair    Additional Postural Observation Details  L Shoulder lower than right     Observations   Left Shoulder   Positive for atrophy  Palpation   Left   Tenderness of the infraspinatus and upper trapezius  Trigger point to infraspinatus  Additional Palpation Details  L humeral head depressed  Gap between L humeral head and acromion   Prominent distal clavicle    Tenderness     Left Shoulder   Tenderness in the biceps tendon (proximal)  Active Range of Motion   Left Shoulder   Flexion: 90 degrees with pain  Abduction: 80 degrees with pain  External rotation 45°: 55 degrees with pain  External rotation BTH: Active external rotation behind the head: NT     Internal rotation BTB: Active internal rotation behind the back: NT      Right Shoulder   Normal active range of motion    Passive Range of Motion   Left Shoulder   Flexion: 100 degrees with pain  Abduction: 85 degrees with pain  External rotation 45°: 60 degrees with pain    Strength/Myotome Testing     Left Shoulder     Planes of Motion   Flexion: 2+   Abduction: 2+   External rotation at 0°: 3+   Internal rotation at 0°: 3+     Isolated Muscles   Biceps: 5   Triceps: 5     Right Shoulder     Planes of Motion   Flexion: 4+   Abduction: 4+   External rotation at 0°: 4+   Internal rotation at 0°: 4+     Isolated Muscles   Biceps: 5   Triceps: 5              Precautions: History of esophageal cancer    Daily Treatment Diary:     Manuals 7/29         Left Shoulder PROM nv                             Neuromuscular Re-education          Pulleys flx/scap? nv         Scap retractions* 2x10x5"         Shoulder shrug 2x10x5"         RTC IR/ER iso 10x5"         Abd iso 10x5"         Wall flexion foam rolls          Cane ER          Bicep curls          Tables slides - flx 10x5"         Table slides - scaption 10x5"         TB ER step out TB IR step out                    Therapeutic Exercise          TB low rows  nv         TB mid rows nv         TB ER/IR          TB bilateral ER w retraction          TB horizontal abduction                              Prone Is          Prone Ts          Prone Ys          SL ER          SL abduction          SL flexion nv         Therapeutic Activity          Wall Ball rolls          Standing Scaption          Patient education                    * = on HEP

## 2022-08-04 ENCOUNTER — OFFICE VISIT (OUTPATIENT)
Dept: PHYSICAL THERAPY | Facility: REHABILITATION | Age: 82
End: 2022-08-04
Payer: COMMERCIAL

## 2022-08-04 DIAGNOSIS — M25.512 CHRONIC LEFT SHOULDER PAIN: Primary | ICD-10-CM

## 2022-08-04 DIAGNOSIS — G89.29 CHRONIC LEFT SHOULDER PAIN: Primary | ICD-10-CM

## 2022-08-04 DIAGNOSIS — S46.012D TRAUMATIC COMPLETE TEAR OF LEFT ROTATOR CUFF, SUBSEQUENT ENCOUNTER: ICD-10-CM

## 2022-08-04 PROCEDURE — 97110 THERAPEUTIC EXERCISES: CPT | Performed by: PHYSICAL THERAPIST

## 2022-08-04 PROCEDURE — 97140 MANUAL THERAPY 1/> REGIONS: CPT | Performed by: PHYSICAL THERAPIST

## 2022-08-04 PROCEDURE — 97112 NEUROMUSCULAR REEDUCATION: CPT | Performed by: PHYSICAL THERAPIST

## 2022-08-04 NOTE — PROGRESS NOTES
Daily Note     Today's date: 2022  Patient name: Lyndsay Cancino  : 1940  MRN: [id number]  Referring provider: Miah Fox, PT  Dx:   Encounter Diagnosis     ICD-10-CM    1  Chronic left shoulder pain  M25 512     G89 29    2  Traumatic complete tear of left rotator cuff, subsequent encounter  S46 012D        Start Time: 1100  Stop Time: 1135  Total time in clinic (min): 35 minutes    Subjective: Patient reports the shoulder is a little sore at start of session  His son reports one episode of severe shoulder pain during table slides at home  Objective: See treatment diary below      Assessment: Tolerated treatment well  Patient achieving ~60* abduction, 80* flexion and 50* ER prior to onset of pain  Good carryover of form noted with all TE initiated last visit  Patient demonstrated fatigue post treatment, exhibited good technique with therapeutic exercises and would benefit from continued PT  Plan: Continue per plan of care        Precautions: History of esophageal cancer    Daily Treatment Diary:     Manuals         Left Shoulder PROM nv 12'                            Neuromuscular Re-education          Pulleys flx/scap? nv 2'         Scap retractions* 2x10x5" 2x10x5"        Shoulder shrug 2x10x5" 2x10x5"        RTC IR/ER iso 10x5" 20x5" ea        Abd iso 10x5" 20x5"        Wall flexion foam rolls  10        Cane ER          Bicep curls  4# 20        Tables slides - flx 10x5"         Table slides - scaption 10x5"         TB ER step out          TB IR step out                    Therapeutic Exercise          TB low rows  nv Pink TB  20        TB mid rows nv GTB   20        TB ER/IR          TB bilateral ER w retraction          TB horizontal abduction                              Prone Is          Prone Ts          Prone Ys          SL ER          SL abduction          SL flexion nv nv        Therapeutic Activity          Wall Ball rolls          Standing Scaption Patient education                    * = on HEP

## 2022-08-09 ENCOUNTER — OFFICE VISIT (OUTPATIENT)
Dept: PHYSICAL THERAPY | Facility: REHABILITATION | Age: 82
End: 2022-08-09
Payer: COMMERCIAL

## 2022-08-09 DIAGNOSIS — M25.512 CHRONIC LEFT SHOULDER PAIN: Primary | ICD-10-CM

## 2022-08-09 DIAGNOSIS — G89.29 CHRONIC LEFT SHOULDER PAIN: Primary | ICD-10-CM

## 2022-08-09 DIAGNOSIS — S46.012D TRAUMATIC COMPLETE TEAR OF LEFT ROTATOR CUFF, SUBSEQUENT ENCOUNTER: ICD-10-CM

## 2022-08-09 PROCEDURE — 97110 THERAPEUTIC EXERCISES: CPT | Performed by: PHYSICAL THERAPIST

## 2022-08-09 PROCEDURE — 97112 NEUROMUSCULAR REEDUCATION: CPT | Performed by: PHYSICAL THERAPIST

## 2022-08-09 PROCEDURE — 97140 MANUAL THERAPY 1/> REGIONS: CPT | Performed by: PHYSICAL THERAPIST

## 2022-08-09 NOTE — PROGRESS NOTES
Daily Note     Today's date: 2022  Patient name: Cristin Hanna  : 1940  MRN: [id number]  Referring provider: Issac Gomes, PT  Dx:   Encounter Diagnosis     ICD-10-CM    1  Chronic left shoulder pain  M25 512     G89 29    2  Traumatic complete tear of left rotator cuff, subsequent encounter  S46 012D        Start Time: 145  Stop Time: 927  Total time in clinic (min): 35 minutes    Subjective: Patient with no new complaints at start of session  Objective: See treatment diary below      Assessment: Tolerated treatment well  Able to tolerate slight progressions in passive range of motion for shoulder abduction and flexion this visit  Continues to have pain with lowering the arm  Overall good tolerance to TE, however patient does report a little bit of pain post-treatment following wall flexion rolls and bicep curls  Patient demonstrated fatigue post treatment, exhibited good technique with therapeutic exercises and would benefit from continued PT  Plan: Continue per plan of care        Precautions: History of esophageal cancer    Daily Treatment Diary:     Manuals        Left Shoulder PROM nv 12' 12'                           Neuromuscular Re-education          Pulleys flx/scap? nv 2'  2' ea        Scap retractions* 2x10x5" 2x10x5" HEP       Shoulder shrug 2x10x5" 2x10x5" HEP       RTC IR/ER iso 10x5" 20x5" ea 20x5" ea       Abd iso 10x5" 20x5" 20x5"       Wall flexion foam rolls  10 10       Cane flexion AAROM   nv       Cane ER          Bicep curls  4# 20 4# 30       Tables slides - flx 10x5"         Table slides - scaption 10x5"         TB ER step out          TB IR step out                    Therapeutic Exercise          TB low rows  nv Pink TB  20 Pink TB 30       TB mid rows nv GTB   20 GTB 30       TB ER/IR          TB bilateral ER w retraction   hard       TB horizontal abduction                              Prone Is          Prone Ts          Prone Ys          SL ER          SL abduction          SL flexion nv nv nv       Therapeutic Activity          Wall Ball rolls          Standing Scaption          Patient education                    * = on HEP

## 2022-08-11 ENCOUNTER — OFFICE VISIT (OUTPATIENT)
Dept: PHYSICAL THERAPY | Facility: REHABILITATION | Age: 82
End: 2022-08-11
Payer: COMMERCIAL

## 2022-08-11 DIAGNOSIS — S46.012D TRAUMATIC COMPLETE TEAR OF LEFT ROTATOR CUFF, SUBSEQUENT ENCOUNTER: ICD-10-CM

## 2022-08-11 DIAGNOSIS — G89.29 CHRONIC LEFT SHOULDER PAIN: Primary | ICD-10-CM

## 2022-08-11 DIAGNOSIS — M25.512 CHRONIC LEFT SHOULDER PAIN: Primary | ICD-10-CM

## 2022-08-11 PROCEDURE — 97140 MANUAL THERAPY 1/> REGIONS: CPT | Performed by: PHYSICAL THERAPIST

## 2022-08-11 PROCEDURE — 97110 THERAPEUTIC EXERCISES: CPT | Performed by: PHYSICAL THERAPIST

## 2022-08-11 NOTE — PROGRESS NOTES
Daily Note     Today's date: 2022  Patient name: Hari Smith  : 1940  MRN: [id number]  Referring provider: Cruzito Luevano, PT  Dx:   Encounter Diagnosis     ICD-10-CM    1  Chronic left shoulder pain  M25 512     G89 29    2  Traumatic complete tear of left rotator cuff, subsequent encounter  S46 012D        Start Time: 50  Stop Time: 926  Total time in clinic (min): 36 minutes    Subjective: Patient reports some soreness in the left shoulder after last session and start of session  Objective: See treatment diary below      Assessment: Tolerated treatment well  Trialed active ROM in gravity eliminated position with pain throughout full range  Progressed RTC isometrics to TB resisted step outs with good tolerance  Should continue with isometrics for flexion and abduction strengthening as patient continues to have most difficulty with active elevation  Patient demonstrated fatigue post treatment, exhibited good technique with therapeutic exercises and would benefit from continued PT  Plan: Continue per plan of care        Precautions: History of esophageal cancer    Daily Treatment Diary:     Manuals       Left Shoulder PROM nv 12' 12' 12'      [facility] distraction    Done                Neuromuscular Re-education          Pulleys flx/scap? nv 2'  2' ea  2' ea      Scap retractions* 2x10x5" 2x10x5" HEP       Shoulder shrug 2x10x5" 2x10x5" HEP       RTC IR/ER iso 10x5" 20x5" ea 20x5" ea dc      Abd iso 10x5" 20x5" 20x5" 20x5"      Ant delt iso    20x5"      Wall flexion foam rolls  10 10 15 (regress nv)      Cane flexion AAROM   nv 10      Cane ER          Bicep curls  4# 20 4# 30 4# 20      Tables slides - flx 10x5"         Table slides - scaption 10x5"                   Therapeutic Exercise          TB low rows  nv Pink TB  20 Pink TB 30 GTB 30      TB mid rows nv GTB   20 GTB 30 BTB 30      TB ER iso step out    PTB 20      TB IR iso step out          TB ER/IR          TB bilateral ER w retraction   hard       TB horizontal abduction                              Prone Is          Prone Ts          Prone Ys          SL ER          SL abduction    p! SL flexion nv nv nv p!       Therapeutic Activity          Wall Ball rolls          Standing Scaption          Patient education                    * = on HEP

## 2022-08-15 ENCOUNTER — OFFICE VISIT (OUTPATIENT)
Dept: PHYSICAL THERAPY | Facility: REHABILITATION | Age: 82
End: 2022-08-15
Payer: COMMERCIAL

## 2022-08-15 DIAGNOSIS — G89.29 CHRONIC LEFT SHOULDER PAIN: Primary | ICD-10-CM

## 2022-08-15 DIAGNOSIS — M25.512 CHRONIC LEFT SHOULDER PAIN: Primary | ICD-10-CM

## 2022-08-15 DIAGNOSIS — S46.012D TRAUMATIC COMPLETE TEAR OF LEFT ROTATOR CUFF, SUBSEQUENT ENCOUNTER: ICD-10-CM

## 2022-08-15 PROCEDURE — 97140 MANUAL THERAPY 1/> REGIONS: CPT | Performed by: PHYSICAL THERAPIST

## 2022-08-15 PROCEDURE — 97112 NEUROMUSCULAR REEDUCATION: CPT | Performed by: PHYSICAL THERAPIST

## 2022-08-15 PROCEDURE — 97110 THERAPEUTIC EXERCISES: CPT | Performed by: PHYSICAL THERAPIST

## 2022-08-15 NOTE — PROGRESS NOTES
Daily Note     Today's date: 8/15/2022  Patient name: Luis M Day  : 1940  MRN: [id number]  Referring provider: Veronica Dugan, PT  Dx:   Encounter Diagnosis     ICD-10-CM    1  Chronic left shoulder pain  M25 512     G89 29    2  Traumatic complete tear of left rotator cuff, subsequent encounter  S46 012D        Start Time: 1147  Stop Time: 1220  Total time in clinic (min): 33 minutes    Subjective: Patient reports some soreness in the shoulder at start of session  Objective: See treatment diary below      Assessment: Tolerated treatment well  Patient demonstrated fatigue post treatment, exhibited good technique with therapeutic exercises and would benefit from continued PT  Plan: Continue per plan of care  Precautions: History of esophageal cancer    Daily Treatment Diary:     Manuals 7/29 8/4 8/9 8/11 8/15     Left Shoulder PROM nv 12' 12' 12' 10'     [facility] distraction    Done Done               Neuromuscular Re-education          Pulleys flx/scap? nv 2'  2' ea  2' ea 2' ea     Scap retractions* 2x10x5" 2x10x5" HEP       Shoulder shrug 2x10x5" 2x10x5" HEP       RTC IR/ER iso 10x5" 20x5" ea 20x5" ea dc      Abd iso 10x5" 20x5" 20x5" 20x5" 20x5"     Ant delt iso    20x5" 20x5"     Wall flexion foam rolls  10 10 15 (regress nv)      Cane flexion AAROM   nv 10 2x10     Cane ER          Bicep curls  4# 20 4# 30 4# 20 4# 2x10     Tables slides - flx 10x5"         Table slides - scaption 10x5"                   Therapeutic Exercise          TB low rows  nv Pink TB  20 Pink TB 30 GTB 30 GTB 30     TB mid rows nv GTB   20 GTB 30 BTB 30 BTB 30     TB ER iso step out    PTB 20 PTB 20     TB IR iso step out     nv     TB ER/IR          TB bilateral ER w retraction   hard       TB horizontal abduction                              Prone Is          Prone Ts          Prone Ys          SL ER          SL abduction    p! SL flexion nv nv nv p!       Therapeutic Activity          Wall Ball rolls Standing Scaption          Patient education                    * = on HEP

## 2022-08-17 ENCOUNTER — OFFICE VISIT (OUTPATIENT)
Dept: PHYSICAL THERAPY | Facility: REHABILITATION | Age: 82
End: 2022-08-17
Payer: COMMERCIAL

## 2022-08-17 DIAGNOSIS — S46.012D TRAUMATIC COMPLETE TEAR OF LEFT ROTATOR CUFF, SUBSEQUENT ENCOUNTER: ICD-10-CM

## 2022-08-17 DIAGNOSIS — G89.29 CHRONIC LEFT SHOULDER PAIN: Primary | ICD-10-CM

## 2022-08-17 DIAGNOSIS — M25.512 CHRONIC LEFT SHOULDER PAIN: Primary | ICD-10-CM

## 2022-08-17 PROCEDURE — 97110 THERAPEUTIC EXERCISES: CPT | Performed by: PHYSICAL THERAPIST

## 2022-08-17 PROCEDURE — 97112 NEUROMUSCULAR REEDUCATION: CPT | Performed by: PHYSICAL THERAPIST

## 2022-08-17 PROCEDURE — 97140 MANUAL THERAPY 1/> REGIONS: CPT | Performed by: PHYSICAL THERAPIST

## 2022-08-17 NOTE — PROGRESS NOTES
Daily Note     Today's date: 2022  Patient name: Hari Smith  : 1940  MRN: [id number]  Referring provider: Cruzito Luevano, PT  Dx:   Encounter Diagnosis     ICD-10-CM    1  Chronic left shoulder pain  M25 512     G89 29    2  Traumatic complete tear of left rotator cuff, subsequent encounter  S46 012D        Start Time: 930  Stop Time: 1002  Total time in clinic (min): 32 minutes    Subjective: Patient reports soreness in the shoulder more towards the top, especially with pulleys and cane flexion  Objective: See treatment diary below      Assessment: Tolerated treatment well  Tenderness to left AC joint and head of the humerus likely secondary to osteoarthritis  Pain noted throughout passive abduction therefore held that direction  Progressed RTC strengthening, can benefit from improved form with IR/ER strengthening in future visits  Educated patient and son on continuing table slides at home in pain free range of motion  Patient demonstrated fatigue post treatment, exhibited good technique with therapeutic exercises and would benefit from continued PT  Plan: Continue per plan of care        Precautions: History of esophageal cancer    Daily Treatment Diary:     Manuals 7/29 8/4 8/9 8/11 8/15 8/17    Left Shoulder PROM nv 12' 12' 12' 10' 12'    GH distraction    Done Done Done    GH inferior mobs      Gr I-II Done    Neuromuscular Re-education          Pulleys flx/scap? nv 2'  2' ea  2' ea 2' ea np    Scap retractions* 2x10x5" 2x10x5" HEP       Shoulder shrug 2x10x5" 2x10x5" HEP       RTC IR/ER iso 10x5" 20x5" ea 20x5" ea dc      Abd iso 10x5" 20x5" 20x5" 20x5" 20x5" 20x5"    Ant delt iso    20x5" 20x5" 20x5"    Wall flexion foam rolls  10 10 15 (regress nv)      Cane flexion AAROM   nv 10 2x10 np    Cane ER          Bicep curls  4# 20 4# 30 4# 20 4# 2x10 3# 20    Tables slides - flx 10x5"         Table slides - scaption 10x5"                   Therapeutic Exercise          TB low rows nv Pink TB  20 Pink TB 30 GTB 30 GTB 30 GTB 3x12    TB mid rows nv GTB   20 GTB 30 BTB 30 BTB 30 BTB 3x12    TB ER iso step out    PTB 20 PTB 20 PTB 20    TB IR iso step out     nv     TB IR      GTB 20    TB bilateral ER w retraction   hard       TB horizontal abduction                              Prone Is          Prone Ts          Prone Ys          SL ER          SL abduction    p! SL flexion nv nv nv p!       Therapeutic Activity          Wall Ball rolls          Standing Scaption          Patient education                    * = on HEP

## 2022-08-24 ENCOUNTER — EVALUATION (OUTPATIENT)
Dept: PHYSICAL THERAPY | Facility: REHABILITATION | Age: 82
End: 2022-08-24
Payer: COMMERCIAL

## 2022-08-24 DIAGNOSIS — G89.29 CHRONIC LEFT SHOULDER PAIN: Primary | ICD-10-CM

## 2022-08-24 DIAGNOSIS — S46.012D TRAUMATIC COMPLETE TEAR OF LEFT ROTATOR CUFF, SUBSEQUENT ENCOUNTER: ICD-10-CM

## 2022-08-24 DIAGNOSIS — M25.512 CHRONIC LEFT SHOULDER PAIN: Primary | ICD-10-CM

## 2022-08-24 PROCEDURE — 97140 MANUAL THERAPY 1/> REGIONS: CPT | Performed by: PHYSICAL THERAPIST

## 2022-08-24 PROCEDURE — 97110 THERAPEUTIC EXERCISES: CPT | Performed by: PHYSICAL THERAPIST

## 2022-08-24 NOTE — LETTER
2022    [address]  [address]  [id number]    Patient: Maria Elena Vincent   YOB: 1940   Date of Visit: 2022     Encounter Diagnosis     ICD-10-CM    1  Chronic left shoulder pain  M25 512     G89 29    2  Traumatic complete tear of left rotator cuff, subsequent encounter  S46 012D        Dear Dr India Leija: Thank you for your recent referral of Maria Elena Vincent  Please review the attached evaluation summary from Herman's recent visit  Please verify that you agree with the plan of care by signing the attached order  If you have any questions or concerns, please do not hesitate to call  I sincerely appreciate the opportunity to share in the care of one of your patients and hope to have another opportunity to work with you in the near future  Sincerely,    Kusum So, PT      Referring Provider:      I certify that I have read the below Plan of Care and certify the need for these services furnished under this plan of treatment while under my care  ALEXYS [address]  [address]  [id number]  Via Fax: [phone]          PT Re-Evaluation     Today's date: 2022  Patient name: Maria Elena Vincent  : 1940  MRN: [id number]  Referring provider: Blake Dozier PT  Dx:   Encounter Diagnosis     ICD-10-CM    1  Chronic left shoulder pain  M25 512     G89 29    2  Traumatic complete tear of left rotator cuff, subsequent encounter  S46 012D        Start Time: 1131  Stop Time: 1210  Total time in clinic (min): 39 minutes    Assessment  Assessment details: RE performed on 22  Lali Huynh has made no significant improvements in left shoulder range of motion or strength  Based on evaluation this visit he presents with slight decline in active motion compared to IE secondary to pain  He can continue to benefit from PT for maintenance of current motion and strengthening of compensatory muscles   Lali Huynh continues to present with the impairments as listed above  Patient would continue to benefit from skilled PT to address these deficits and to maximize function  Carla Lunsford is an 80y o  year old male who presents to IE with traumatic complete tear of left rotator cuff  As a result of this injury he presents with limitations in left shoulder range of motion, strength and pain with function  This is limiting him from functional activities such as reaching overhead, lifting and dressing  He will benefit from PT to maintain mobility and improve strength and prevent decline of left shoulder function  Krystal Yusuf presents with the impairments as listed above and would benefit from Physical Therapy to address these impairments, improved quality of life and to maximize function  Impairments: abnormal or restricted ROM, activity intolerance, impaired physical strength, lacks appropriate home exercise program, pain with function and poor posture   Understanding of Dx/Px/POC: good   Prognosis: good    Goals  ST  Patient will report <5/10 pain with all activity  Not met   2  Patient will maintain left shoulder range of motion  Not met    LT  Patient will be independent with HEP upon discharge  progressing  2  Patient will demonstrate improvement in left shoulder strength by 1-2 grades  Not met  3  Patient will demonstrate improvement in left shoulder range of motion by 50%  Not met      Plan  Plan details: Thank you for the opportunity to participate in the care of Carla Lunsford      Patient would benefit from: PT eval and skilled physical therapy  Planned modality interventions: cryotherapy, unattended electrical stimulation and TENS  Planned therapy interventions: activity modification, flexibility, home exercise program, therapeutic exercise, therapeutic activities, stretching, strengthening, postural training, patient education, neuromuscular re-education, manual therapy and joint mobilization  Frequency: 2x week  Duration in visits: 10  Plan of Care beginning date: 2022  Treatment plan discussed with: patient        Subjective Evaluation    History of Present Illness  Mechanism of injury: RE performed on 22  Tatiana Gama has been seen for a total of 7 visits for OP PT for chronic left shoulder pain  Patient reports he felt some improvements when first beginning PT but recently within the last two weeks has had increased pain  Patient's global rating of change is " About the same (0) " He reports continued pain with any active movement of the left shoulder, especially elevation  He has also been having sharp, shooting pains at rest  He follows up with PCP next week  IE 2022:  Patient is a 80 y o  presenting to physical therapy with complaints of chronic left shoulder pain  Patient was seen in PT for this same problem January-2021  Patient denies specific injury to the left shoulder but in 2021 he was helping a cousin and did a lot of repetitive overhead activity  Patient's son reports full tears and retraction of the supraspinatus and infraspinatus  He reports he is in more pain now than when we saw him in February  He reports pain at rest and in the posterior shoulder with lifting or reaching overhead  He can get a shooting pain in the left shoulder  Patient has pain when sleeping on that side  He traveled to [facility] for a few months and did not keep up with the exercises  Denies numbness/tingling  Patient's son and wife present throughout evaluation for translation      Pain  Current pain ratin  At best pain rating: 3  At worst pain rating: 10  Quality: dull ache and sharp  Relieving factors: rest, relaxation and change in position  Aggravating factors: lifting and overhead activity    Social Support  Lives with: adult children    Employment status: not working  Hand dominance: left    Treatments  Current treatment: physical therapy  Patient Goals  Patient goals for therapy: decreased pain, increased strength, increased motion, independence with ADLs/IADLs and return to sport/leisure activities          Objective     Static Posture     Head  Forward  Shoulders  Asymmetric shoulders and rounded  Thoracic Spine  Hyperkyphosis  Postural Observations  Seated posture: poor  Standing posture: fair    Additional Postural Observation Details  L Shoulder lower than right     Observations   Left Shoulder   Positive for atrophy  Palpation   Left   Tenderness of the infraspinatus and upper trapezius  Trigger point to infraspinatus  Additional Palpation Details  L humeral head depressed  Gap between L humeral head and acromion   Prominent distal clavicle    Tenderness     Left Shoulder   Tenderness in the biceps tendon (proximal)  Active Range of Motion   Left Shoulder   Flexion: 45 degrees with pain  Abduction: 50 degrees with pain  External rotation 45°: 50 degrees with pain  External rotation BTH: Active external rotation behind the head: NT     Internal rotation BTB: Active internal rotation behind the back: NT      Right Shoulder   Normal active range of motion    Passive Range of Motion   Left Shoulder   Flexion: 100 degrees with pain  Abduction: 85 degrees with pain  External rotation 45°: 55 degrees with pain    Strength/Myotome Testing     Left Shoulder     Planes of Motion   Flexion: 2+   Abduction: 2+   External rotation at 0°: 3+   Internal rotation at 0°: 3+     Isolated Muscles   Biceps: 5   Triceps: 5     Right Shoulder     Planes of Motion   Flexion: 4+   Abduction: 4+   External rotation at 0°: 4+   Internal rotation at 0°: 4+     Isolated Muscles   Biceps: 5   Triceps: 5              Precautions: History of esophageal cancer    Daily Treatment Diary:     Manuals 7/29 8/4 8/9 8/11 8/15 8/17 8/24   Left Shoulder PROM nv 12' 12' 12' 10' 12' Done   Scapular STM       Infra/rhomboids Done   GH distraction    Done Done Done    GH inferior mobs      Gr I-II Done    Neuromuscular Re-education Pulleys flx/scap? nv 2'  2' ea  2' ea 2' ea np    Scap retractions* 2x10x5" 2x10x5" HEP       Shoulder shrug 2x10x5" 2x10x5" HEP       RTC IR/ER iso 10x5" 20x5" ea 20x5" ea dc      Abd iso 10x5" 20x5" 20x5" 20x5" 20x5" 20x5"    Ant delt iso    20x5" 20x5" 20x5"    Wall flexion foam rolls  10 10 15 (regress nv)      Cane flexion AAROM   nv 10 2x10 np    Cane ER          Bicep curls  4# 20 4# 30 4# 20 4# 2x10 3# 20 hold   Tables slides - flx 10x5"         Table slides - scaption 10x5"                   Therapeutic Exercise          TB low rows  nv Pink TB  20 Pink TB 30 GTB 30 GTB 30 GTB 3x12 Uni  GTB 3x12   TB mid rows nv GTB   20 GTB 30 BTB 30 BTB 30 BTB 3x12 Uni  BTB 3x12   TB ER iso step out    PTB 20 PTB 20 PTB 20 PTB 20   TB IR iso step out     nv     TB IR      GTB 20 GTB 20   TB bilateral ER w retraction   hard       TB horizontal abduction                              Prone Is          Prone Ts          Prone Ys          SL ER          SL abduction    p! SL flexion nv nv nv p!       Therapeutic Activity          Wall Ball rolls          Standing Scaption          Patient education                    * = on HEP

## 2022-08-24 NOTE — LETTER
2022    Disha Nuñez PT    Patient: Ama Gonzales   YOB: 1940   Date of Visit: 2022     Encounter Diagnosis     ICD-10-CM    1  Chronic left shoulder pain  M25 512     G89 29    2  Traumatic complete tear of left rotator cuff, subsequent encounter  S46 012D        Dear Dr Swati Chang: Thank you for your recent referral of Aam Gonzales  Please review the attached evaluation summary from Herman's recent visit  Please verify that you agree with the plan of care by signing the attached order  If you have any questions or concerns, please do not hesitate to call  I sincerely appreciate the opportunity to share in the care of one of your patients and hope to have another opportunity to work with you in the near future  Sincerely,    Disha Nuñez PT      Referring Provider:      I certify that I have read the below Plan of Care and certify the need for these services furnished under this plan of treatment while under my care  Disha Nuñez PT  [facility] [place]          PT Re-Evaluation     Today's date: 2022  Patient name: Ama Gonzales  : 1940  MRN: [id number]  Referring provider: Denise Manjarrez PT  Dx:   Encounter Diagnosis     ICD-10-CM    1  Chronic left shoulder pain  M25 512     G89 29    2  Traumatic complete tear of left rotator cuff, subsequent encounter  S46 012D        Start Time: 1131  Stop Time: 1210  Total time in clinic (min): 39 minutes    Assessment  Assessment details: RE performed on 22  Marcy Kim has made no significant improvements in left shoulder range of motion or strength  Based on evaluation this visit he presents with slight decline in active motion compared to IE secondary to pain  He can continue to benefit from PT for maintenance of current motion and strengthening of compensatory muscles  Luiscelinaapple Kim continues to present with the impairments as listed above   Patient would continue to benefit from skilled PT to address these deficits and to maximize function  Belkys Kuhn is an 80y o  year old male who presents to IE with traumatic complete tear of left rotator cuff  As a result of this injury he presents with limitations in left shoulder range of motion, strength and pain with function  This is limiting him from functional activities such as reaching overhead, lifting and dressing  He will benefit from PT to maintain mobility and improve strength and prevent decline of left shoulder function  America Phan presents with the impairments as listed above and would benefit from Physical Therapy to address these impairments, improved quality of life and to maximize function  Impairments: abnormal or restricted ROM, activity intolerance, impaired physical strength, lacks appropriate home exercise program, pain with function and poor posture   Understanding of Dx/Px/POC: good   Prognosis: good    Goals  ST  Patient will report <5/10 pain with all activity  Not met   2  Patient will maintain left shoulder range of motion  Not met    LT  Patient will be independent with HEP upon discharge  progressing  2  Patient will demonstrate improvement in left shoulder strength by 1-2 grades  Not met  3  Patient will demonstrate improvement in left shoulder range of motion by 50%  Not met      Plan  Plan details: Thank you for the opportunity to participate in the care of Belkys Kuhn      Patient would benefit from: PT eval and skilled physical therapy  Planned modality interventions: cryotherapy, unattended electrical stimulation and TENS  Planned therapy interventions: activity modification, flexibility, home exercise program, therapeutic exercise, therapeutic activities, stretching, strengthening, postural training, patient education, neuromuscular re-education, manual therapy and joint mobilization  Frequency: 2x week  Duration in visits: 10  Plan of Care beginning date: 2022  Treatment plan discussed with: patient        Subjective Evaluation    History of Present Illness  Mechanism of injury: RE performed on 22  Krystal Yusuf has been seen for a total of 7 visits for OP PT for chronic left shoulder pain  Patient reports he felt some improvements when first beginning PT but recently within the last two weeks has had increased pain  Patient's global rating of change is " About the same (0) " He reports continued pain with any active movement of the left shoulder, especially elevation  He has also been having sharp, shooting pains at rest  He follows up with PCP next week  IE 2022:  Patient is a 80 y o  presenting to physical therapy with complaints of chronic left shoulder pain  Patient was seen in PT for this same problem January-2021  Patient denies specific injury to the left shoulder but in 2021 he was helping a cousin and did a lot of repetitive overhead activity  Patient's son reports full tears and retraction of the supraspinatus and infraspinatus  He reports he is in more pain now than when we saw him in February  He reports pain at rest and in the posterior shoulder with lifting or reaching overhead  He can get a shooting pain in the left shoulder  Patient has pain when sleeping on that side  He traveled to [facility] for a few months and did not keep up with the exercises  Denies numbness/tingling  Patient's son and wife present throughout evaluation for translation      Pain  Current pain ratin  At best pain rating: 3  At worst pain rating: 10  Quality: dull ache and sharp  Relieving factors: rest, relaxation and change in position  Aggravating factors: lifting and overhead activity    Social Support  Lives with: adult children    Employment status: not working  Hand dominance: left    Treatments  Current treatment: physical therapy  Patient Goals  Patient goals for therapy: decreased pain, increased strength, increased motion, independence with ADLs/IADLs and return to sport/leisure activities          Objective     Static Posture     Head  Forward  Shoulders  Asymmetric shoulders and rounded  Thoracic Spine  Hyperkyphosis  Postural Observations  Seated posture: poor  Standing posture: fair    Additional Postural Observation Details  L Shoulder lower than right     Observations   Left Shoulder   Positive for atrophy  Palpation   Left   Tenderness of the infraspinatus and upper trapezius  Trigger point to infraspinatus  Additional Palpation Details  L humeral head depressed  Gap between L humeral head and acromion   Prominent distal clavicle    Tenderness     Left Shoulder   Tenderness in the biceps tendon (proximal)  Active Range of Motion   Left Shoulder   Flexion: 45 degrees with pain  Abduction: 50 degrees with pain  External rotation 45°: 50 degrees with pain  External rotation BTH: Active external rotation behind the head: NT     Internal rotation BTB: Active internal rotation behind the back: NT      Right Shoulder   Normal active range of motion    Passive Range of Motion   Left Shoulder   Flexion: 100 degrees with pain  Abduction: 85 degrees with pain  External rotation 45°: 55 degrees with pain    Strength/Myotome Testing     Left Shoulder     Planes of Motion   Flexion: 2+   Abduction: 2+   External rotation at 0°: 3+   Internal rotation at 0°: 3+     Isolated Muscles   Biceps: 5   Triceps: 5     Right Shoulder     Planes of Motion   Flexion: 4+   Abduction: 4+   External rotation at 0°: 4+   Internal rotation at 0°: 4+     Isolated Muscles   Biceps: 5   Triceps: 5              Precautions: History of esophageal cancer    Daily Treatment Diary:     Manuals 7/29 8/4 8/9 8/11 8/15 8/17 8/24   Left Shoulder PROM nv 12' 12' 12' 10' 12' Done   Scapular STM       Infra/rhomboids Done   GH distraction    Done Done Done    GH inferior mobs      Gr I-II Done    Neuromuscular Re-education          Pulleys flx/scap? nv 2'  2' ea  2' ea 2' ea np    Scap retractions* 2x10x5" 2x10x5" HEP       Shoulder shrug 2x10x5" 2x10x5" HEP       RTC IR/ER iso 10x5" 20x5" ea 20x5" ea dc      Abd iso 10x5" 20x5" 20x5" 20x5" 20x5" 20x5"    Ant delt iso    20x5" 20x5" 20x5"    Wall flexion foam rolls  10 10 15 (regress nv)      Cane flexion AAROM   nv 10 2x10 np    Cane ER          Bicep curls  4# 20 4# 30 4# 20 4# 2x10 3# 20 hold   Tables slides - flx 10x5"         Table slides - scaption 10x5"                   Therapeutic Exercise          TB low rows  nv Pink TB  20 Pink TB 30 GTB 30 GTB 30 GTB 3x12 Uni  GTB 3x12   TB mid rows nv GTB   20 GTB 30 BTB 30 BTB 30 BTB 3x12 Uni  BTB 3x12   TB ER iso step out    PTB 20 PTB 20 PTB 20 PTB 20   TB IR iso step out     nv     TB IR      GTB 20 GTB 20   TB bilateral ER w retraction   hard       TB horizontal abduction                              Prone Is          Prone Ts          Prone Ys          SL ER          SL abduction    p! SL flexion nv nv nv p!       Therapeutic Activity          Wall Ball rolls          Standing Scaption          Patient education                    * = on HEP

## 2022-08-24 NOTE — PROGRESS NOTES
PT Re-Evaluation     Today's date: 2022  Patient name: Ama Gonzales  : 1940  MRN: [id number]  Referring provider: Denise Manjarrez PT  Dx:   Encounter Diagnosis     ICD-10-CM    1  Chronic left shoulder pain  M25 512     G89 29    2  Traumatic complete tear of left rotator cuff, subsequent encounter  S46 012D        Start Time: 1131  Stop Time: 1210  Total time in clinic (min): 39 minutes    Assessment  Assessment details: RE performed on 22  Marcy Kim has made no significant improvements in left shoulder range of motion or strength  Based on evaluation this visit he presents with slight decline in active motion compared to IE secondary to pain  He can continue to benefit from PT for maintenance of current motion and strengthening of compensatory muscles  Marcy Kim continues to present with the impairments as listed above  Patient would continue to benefit from skilled PT to address these deficits and to maximize function  Ama Gonzales is an 80y o  year old male who presents to IE with traumatic complete tear of left rotator cuff  As a result of this injury he presents with limitations in left shoulder range of motion, strength and pain with function  This is limiting him from functional activities such as reaching overhead, lifting and dressing  He will benefit from PT to maintain mobility and improve strength and prevent decline of left shoulder function  Marcy Kim presents with the impairments as listed above and would benefit from Physical Therapy to address these impairments, improved quality of life and to maximize function  Impairments: abnormal or restricted ROM, activity intolerance, impaired physical strength, lacks appropriate home exercise program, pain with function and poor posture   Understanding of Dx/Px/POC: good   Prognosis: good    Goals  ST  Patient will report <5/10 pain with all activity  Not met   2  Patient will maintain left shoulder range of motion   Not met    LT  Patient will be independent with HEP upon discharge  progressing  2  Patient will demonstrate improvement in left shoulder strength by 1-2 grades  Not met  3  Patient will demonstrate improvement in left shoulder range of motion by 50%  Not met      Plan  Plan details: Thank you for the opportunity to participate in the care of [facility]  Patient would benefit from: PT eval and skilled physical therapy  Planned modality interventions: cryotherapy, unattended electrical stimulation and TENS  Planned therapy interventions: activity modification, flexibility, home exercise program, therapeutic exercise, therapeutic activities, stretching, strengthening, postural training, patient education, neuromuscular re-education, manual therapy and joint mobilization  Frequency: 2x week  Duration in visits: 10  Plan of Care beginning date: 2022  Treatment plan discussed with: patient        Subjective Evaluation    History of Present Illness  Mechanism of injury: RE performed on 22 Sickle has been seen for a total of 7 visits for OP PT for chronic left shoulder pain  Patient reports he felt some improvements when first beginning PT but recently within the last two weeks has had increased pain  Patient's global rating of change is " About the same (0) " He reports continued pain with any active movement of the left shoulder, especially elevation  He has also been having sharp, shooting pains at rest  He follows up with PCP next week  IE 2022:  Patient is a 80 y o  presenting to physical therapy with complaints of chronic left shoulder pain  Patient was seen in PT for this same problem January-2021  Patient denies specific injury to the left shoulder but in 2021 he was helping a cousin and did a lot of repetitive overhead activity  Patient's son reports full tears and retraction of the supraspinatus and infraspinatus  He reports he is in more pain now than when we saw him in February  He reports pain at rest and in the posterior shoulder with lifting or reaching overhead  He can get a shooting pain in the left shoulder  Patient has pain when sleeping on that side  He traveled to [facility] for a few months and did not keep up with the exercises  Denies numbness/tingling  Patient's son and wife present throughout evaluation for translation  Pain  Current pain ratin  At best pain rating: 3  At worst pain rating: 10  Quality: dull ache and sharp  Relieving factors: rest, relaxation and change in position  Aggravating factors: lifting and overhead activity    Social Support  Lives with: adult children    Employment status: not working  Hand dominance: left    Treatments  Current treatment: physical therapy  Patient Goals  Patient goals for therapy: decreased pain, increased strength, increased motion, independence with ADLs/IADLs and return to sport/leisure activities          Objective     Static Posture     Head  Forward  Shoulders  Asymmetric shoulders and rounded  Thoracic Spine  Hyperkyphosis  Postural Observations  Seated posture: poor  Standing posture: fair    Additional Postural Observation Details  L Shoulder lower than right     Observations   Left Shoulder   Positive for atrophy  Palpation   Left   Tenderness of the infraspinatus and upper trapezius  Trigger point to infraspinatus  Additional Palpation Details  L humeral head depressed  Gap between L humeral head and acromion   Prominent distal clavicle    Tenderness     Left Shoulder   Tenderness in the biceps tendon (proximal)  Active Range of Motion   Left Shoulder   Flexion: 45 degrees with pain  Abduction: 50 degrees with pain  External rotation 45°: 50 degrees with pain  External rotation BTH: Active external rotation behind the head: NT     Internal rotation BTB: Active internal rotation behind the back: NT      Right Shoulder   Normal active range of motion    Passive Range of Motion   Left Shoulder   Flexion: 100 degrees with pain  Abduction: 85 degrees with pain  External rotation 45°: 55 degrees with pain    Strength/Myotome Testing     Left Shoulder     Planes of Motion   Flexion: 2+   Abduction: 2+   External rotation at 0°: 3+   Internal rotation at 0°: 3+     Isolated Muscles   Biceps: 5   Triceps: 5     Right Shoulder     Planes of Motion   Flexion: 4+   Abduction: 4+   External rotation at 0°: 4+   Internal rotation at 0°: 4+     Isolated Muscles   Biceps: 5   Triceps: 5              Precautions: History of esophageal cancer    Daily Treatment Diary:     Manuals 7/29 8/4 8/9 8/11 8/15 8/17 8/24   Left Shoulder PROM nv 12' 12' 12' 10' 12' Done   Scapular STM       Infra/rhomboids Done   GH distraction    Done Done Done    GH inferior mobs      Gr I-II Done    Neuromuscular Re-education          Pulleys flx/scap? nv 2'  2' ea  2' ea 2' ea np    Scap retractions* 2x10x5" 2x10x5" HEP       Shoulder shrug 2x10x5" 2x10x5" HEP       RTC IR/ER iso 10x5" 20x5" ea 20x5" ea dc      Abd iso 10x5" 20x5" 20x5" 20x5" 20x5" 20x5"    Ant delt iso    20x5" 20x5" 20x5"    Wall flexion foam rolls  10 10 15 (regress nv)      Cane flexion AAROM   nv 10 2x10 np    Cane ER          Bicep curls  4# 20 4# 30 4# 20 4# 2x10 3# 20 hold   Tables slides - flx 10x5"         Table slides - scaption 10x5"                   Therapeutic Exercise          TB low rows  nv Pink TB  20 Pink TB 30 GTB 30 GTB 30 GTB 3x12 Uni  GTB 3x12   TB mid rows nv GTB   20 GTB 30 BTB 30 BTB 30 BTB 3x12 Uni  BTB 3x12   TB ER iso step out    PTB 20 PTB 20 PTB 20 PTB 20   TB IR iso step out     nv     TB IR      GTB 20 GTB 20   TB bilateral ER w retraction   hard       TB horizontal abduction                              Prone Is          Prone Ts          Prone Ys          SL ER          SL abduction    p! SL flexion nv nv nv p!       Therapeutic Activity          Wall Ball rolls          Standing Scaption          Patient education * = on HEP

## 2022-08-29 ENCOUNTER — HOSPITAL ENCOUNTER (OUTPATIENT)
Dept: RADIOLOGY | Facility: HOSPITAL | Age: 82
Discharge: HOME | End: 2022-08-29
Payer: COMMERCIAL

## 2022-08-29 DIAGNOSIS — R09.89 ABNORMAL CHEST SOUNDS: ICD-10-CM

## 2022-08-29 DIAGNOSIS — M13.841: ICD-10-CM

## 2022-08-29 PROCEDURE — 73130 X-RAY EXAM OF HAND: CPT

## 2022-08-29 PROCEDURE — 71046 X-RAY EXAM CHEST 2 VIEWS: CPT

## 2022-09-02 ENCOUNTER — OFFICE VISIT (OUTPATIENT)
Dept: PHYSICAL THERAPY | Facility: REHABILITATION | Age: 82
End: 2022-09-02
Payer: COMMERCIAL

## 2022-09-02 DIAGNOSIS — G89.29 CHRONIC LEFT SHOULDER PAIN: Primary | ICD-10-CM

## 2022-09-02 DIAGNOSIS — S46.012D TRAUMATIC COMPLETE TEAR OF LEFT ROTATOR CUFF, SUBSEQUENT ENCOUNTER: ICD-10-CM

## 2022-09-02 DIAGNOSIS — M25.512 CHRONIC LEFT SHOULDER PAIN: Primary | ICD-10-CM

## 2022-09-02 PROCEDURE — 97140 MANUAL THERAPY 1/> REGIONS: CPT

## 2022-09-02 PROCEDURE — 97112 NEUROMUSCULAR REEDUCATION: CPT

## 2022-09-02 PROCEDURE — 97110 THERAPEUTIC EXERCISES: CPT

## 2022-09-02 NOTE — PROGRESS NOTES
Daily Note     Today's date: 2022  Patient name: Mary Beth Lynch  : 1940  MRN: [id number]  Referring provider: Alexa Kelley PT  Dx:   Encounter Diagnosis     ICD-10-CM    1  Chronic left shoulder pain  M25 512     G89 29    2  Traumatic complete tear of left rotator cuff, subsequent encounter  S46 012D        Start Time: 930  Stop Time: 1000  Total time in clinic (min): 30 minutes    Subjective:  Patient reports shoulder as "good" at start of session, reporting no pain at start of session  Objective: See treatment diary below      Assessment: Tolerated treatment well  Patient demonstrated fatigue post treatment, exhibited good technique with therapeutic exercises and would benefit from continued PT Patient limited with shoulder PROM in most planes, guarding noted  Tolerated all TE performed today well, patient reporting no pain with any TE performed  Plan: Continue per plan of care  Progress treatment as tolerated  Precautions: History of esophageal cancer    Daily Treatment Diary:     Manuals 9/2  8/9 8/11 8/15 8/17 8/24   Left Shoulder PROM Done   12' 12' 10' 12' Done   Scapular STM       Infra/rhomboids Done   GH distraction    Done Done Done    GH inferior mobs      Gr I-II Done    Neuromuscular Re-education          Pulleys flx/scap?    2' ea  2' ea 2' ea np    Scap retractions*   HEP       Shoulder shrug   HEP       RTC IR/ER iso   20x5" ea dc      Abd iso 20x5''  20x5" 20x5" 20x5" 20x5"    Ant delt iso 20x5''   20x5" 20x5" 20x5"    Wall flexion foam rolls   10 15 (regress nv)      Cane flexion AAROM   nv 10 2x10 np    Cane ER          Bicep curls   4# 30 4# 20 4# 2x10 3# 20 hold   Tables slides - flx          Table slides - scaption                    Therapeutic Exercise          TB low rows  Uni GTB 3x12  Pink TB 30 GTB 30 GTB 30 GTB 3x12 Uni  GTB 3x12   TB mid rows Uni BTB 3x12  GTB 30 BTB 30 BTB 30 BTB 3x12 Uni  BTB 3x12   TB ER iso step out PTB 30   PTB 20 PTB 20 PTB 20 PTB 20   TB IR iso step out     nv     TB IR GTB 30     GTB 20 GTB 20   TB bilateral ER w retraction   hard       TB horizontal abduction                              Prone Is          Prone Ts          Prone Ys          SL ER          SL abduction    p! SL flexion   nv p!       Therapeutic Activity          Wall Ball rolls          Standing Scaption          Patient education                    * = on HEP

## 2022-09-09 ENCOUNTER — OFFICE VISIT (OUTPATIENT)
Dept: PHYSICAL THERAPY | Facility: REHABILITATION | Age: 82
End: 2022-09-09
Payer: COMMERCIAL

## 2022-09-09 DIAGNOSIS — S46.012D TRAUMATIC COMPLETE TEAR OF LEFT ROTATOR CUFF, SUBSEQUENT ENCOUNTER: ICD-10-CM

## 2022-09-09 DIAGNOSIS — M25.512 CHRONIC LEFT SHOULDER PAIN: Primary | ICD-10-CM

## 2022-09-09 DIAGNOSIS — G89.29 CHRONIC LEFT SHOULDER PAIN: Primary | ICD-10-CM

## 2022-09-09 PROCEDURE — 97110 THERAPEUTIC EXERCISES: CPT

## 2022-09-09 PROCEDURE — 97112 NEUROMUSCULAR REEDUCATION: CPT

## 2022-09-09 PROCEDURE — 97140 MANUAL THERAPY 1/> REGIONS: CPT

## 2022-09-09 NOTE — PROGRESS NOTES
Daily Note     Today's date: 2022  Patient name: Светлана Bull  : 1940  MRN: [id number]  Referring provider: Neena Aguero, PT  Dx:   Encounter Diagnosis     ICD-10-CM    1  Chronic left shoulder pain  M25 512     G89 29    2  Traumatic complete tear of left rotator cuff, subsequent encounter  S46 012D        Start Time: 45  Stop Time: 915  Total time in clinic (min): 30 minutes    Subjective: Patient reporting shoulder as "okay" at start of session, reporting some soreness  Objective: See treatment diary below      Assessment: Tolerated treatment well  Patient demonstrated fatigue post treatment, exhibited good technique with therapeutic exercises and would benefit from continued PT Reports of soreness noted throughout session especially with TB TE  Cues required for taking proper rest breaks in between sets  Plan: Continue per plan of care  Progress treatment as tolerated  Precautions: History of esophageal cancer    Daily Treatment Diary:     Manuals 9/2 9/8  8/11 8/15 8/17 8/24   Left Shoulder PROM Done  Done   ' 10' 12' Done   Scapular STM       Infra/rhomboids Done   GH distraction    Done Done Done    GH inferior mobs      Gr I-II Done    Neuromuscular Re-education          Pulleys flx/scap?     2' ea 2' ea np    Scap retractions*          Shoulder shrug          RTC IR/ER iso    dc      Abd iso 20x5'' 20x5''  20x5" 20x5" 20x5"    Ant delt iso 20x5'' 20x5''  20x5" 20x5" 20x5"    Wall flexion foam rolls    15 (regress nv)      Cane flexion AAROM    10 2x10 np    Cane ER          Bicep curls    4# 20 4# 2x10 3# 20 hold   Tables slides - flx          Table slides - scaption                    Therapeutic Exercise          TB low rows  Uni GTB 3x12 Uni GTB 3x15  GTB 30 GTB 30 GTB 3x12 Uni  GTB 3x12   TB mid rows Uni BTB 3x12 Unit BTB 3x15  BTB 30 BTB 30 BTB 3x12 Uni  BTB 3x12   TB ER iso step out PTB 30 PTB 3x10  PTB 20 PTB 20 PTB 20 PTB 20   TB IR iso step out     nv     TB IR GTB 30 GTB 3x10    GTB 20 GTB 20   TB bilateral ER w retraction          TB horizontal abduction                              Prone Is          Prone Ts          Prone Ys          SL ER          SL abduction    p! SL flexion    p!       Therapeutic Activity          Wall Ball rolls          Standing Scaption          Patient education                    * = on HEP

## 2022-09-15 ENCOUNTER — APPOINTMENT (OUTPATIENT)
Dept: PHYSICAL THERAPY | Facility: REHABILITATION | Age: 82
End: 2022-09-15
Payer: COMMERCIAL

## 2022-09-27 NOTE — PROGRESS NOTES
PT Discharge    Today's date: 2022  Patient name: Mary Beth Lynch  : 1940  MRN: [id number]  Referring provider: Alexa Kelley PT  Dx:   Encounter Diagnosis     ICD-10-CM    1  Chronic left shoulder pain  M25 512     G89 29    2  Traumatic complete tear of left rotator cuff, subsequent encounter  S46 012D        Start Time: 0845  Stop Time: 0915  Total time in clinic (min): 30 minutes    Assessment  Assessment details: Mary Beth Lynch seen for OP PT for chronic left shoulder pain for 9 visits with most recent visit on 2022  Mary Beth Lynch was compliant to PT and HEP however minimal improvements were made in terms of pain, range of motion and strength secondary to nature of injury/RTC tear in the left shoulder  At this time, Mary Beth Lynch will be discharged from physical therapy services  Patient given HEP throughout OP PT and is encouraged to contact PT with any questions or concerns in the future              Subjective    Objective    Flowsheet Rows    Flowsheet Row Most Recent Value   PT/OT G-Codes    Current Score 53   Projected Score 58

## 2023-12-04 ENCOUNTER — HOSPITAL ENCOUNTER (OUTPATIENT)
Dept: RADIOLOGY | Facility: HOSPITAL | Age: 83
Discharge: HOME | End: 2023-12-04
Payer: COMMERCIAL

## 2023-12-04 DIAGNOSIS — R10.9 STOMACH ACHE: ICD-10-CM

## 2023-12-04 PROCEDURE — 74019 RADEX ABDOMEN 2 VIEWS: CPT
